# Patient Record
Sex: FEMALE | Race: WHITE | NOT HISPANIC OR LATINO | Employment: OTHER
[De-identification: names, ages, dates, MRNs, and addresses within clinical notes are randomized per-mention and may not be internally consistent; named-entity substitution may affect disease eponyms.]

---

## 2023-04-30 ENCOUNTER — TRANSCRIBE ORDERS (OUTPATIENT)
Dept: OTHER | Age: 68
End: 2023-04-30

## 2023-04-30 DIAGNOSIS — M54.17 LUMBOSACRAL RADICULOPATHY AT L5: Primary | ICD-10-CM

## 2023-05-15 ENCOUNTER — THERAPY VISIT (OUTPATIENT)
Dept: PHYSICAL THERAPY | Facility: CLINIC | Age: 68
End: 2023-05-15
Attending: FAMILY MEDICINE
Payer: COMMERCIAL

## 2023-05-15 DIAGNOSIS — R53.81 PHYSICAL DECONDITIONING: Primary | ICD-10-CM

## 2023-05-15 DIAGNOSIS — M54.17 LUMBOSACRAL RADICULOPATHY AT L5: ICD-10-CM

## 2023-05-15 PROCEDURE — 97161 PT EVAL LOW COMPLEX 20 MIN: CPT | Mod: GP | Performed by: PHYSICAL THERAPIST

## 2023-05-15 PROCEDURE — 97110 THERAPEUTIC EXERCISES: CPT | Mod: GP | Performed by: PHYSICAL THERAPIST

## 2023-05-15 PROCEDURE — 97140 MANUAL THERAPY 1/> REGIONS: CPT | Mod: GP | Performed by: PHYSICAL THERAPIST

## 2023-05-15 NOTE — PROGRESS NOTES
05/15/23 0500   Body Part   Goals listed below are for stnding   Goal #1   Goal #1 standing   Previous Functional Level No restrictions   Current Functional Level Minutes patient can stand   Performance level 5' 9/10   STG Target Performance Minutes patient will be able to stand   Performance level 15'  2/10   Rationale for housekeeping tasks such as vacuuming, bed making, mowing, gardening;for personal hygiene;for meal preparation;for safe household ambulation   Due date 06/01/23   LTG Target Performance Minutes patient will be able to stand   Performance Level 60' no pain   Rationale for housekeeping tasks such as vacuuming, bed making, mowing;for personal hygiene;for meal preparation;for safe household ambulation   Due date 08/01/23                                                                              [facility]    OUTPATIENT Physical Therapy ORTHOPEDIC EVALUATION  PLAN OF TREATMENT FOR OUTPATIENT REHABILITATION  (COMPLETE FOR INITIAL CLAIMS ONLY)  Patient's Last Name, First Name, M.I.  YOB: 1955  Ciara Kaufman    Provider s Name:  [facility]   Medical Record No.  [id number]   Start of Care Date:  05/15/23   Onset Date:   03/15/23   Treatment Diagnosis:  lumbar radic/sciatica left Medical Diagnosis:  Lumbosacral radiculopathy at L5       Goals:     05/15/23 0500   Body Part   Goals listed below are for stnding   Goal #1   Goal #1 standing   Previous Functional Level No restrictions   Current Functional Level Minutes patient can stand   Performance level 5' 9/10   STG Target Performance Minutes patient will be able to stand   Performance level 15'  2/10   Rationale for housekeeping tasks such as vacuuming, bed making, mowing, gardening;for personal hygiene;for meal preparation;for safe household ambulation   Due date 06/01/23   LTG Target Performance Minutes patient will be able to stand   Performance Level 60' no pain    Rationale for housekeeping tasks such as vacuuming, bed making, mowing;for personal hygiene;for meal preparation;for safe household ambulation   Due date 08/01/23       Therapy Frequency:  weekly  Predicted Duration of Therapy Intervention:  12 weeks    Adonis Cronin, PT                 I CERTIFY THE NEED FOR THESE SERVICES FURNISHED UNDER        THIS PLAN OF TREATMENT AND WHILE UNDER MY CARE .             Physician Signature               Date    X_____________________________________________________                         Certification Date From:  05/15/23   Certification Date To:  08/12/23    Referring Provider:  ROMAINE López    Initial Assessment        See Epic Evaluation SOC Date: 05/15/23

## 2023-05-15 NOTE — PROGRESS NOTES
05/15/23 0500   Body Part   Goals listed below are for stnding   Goal #1   Goal #1 standing   Previous Functional Level No restrictions   Current Functional Level Minutes patient can stand   Performance level 5' 9/10   STG Target Performance Minutes patient will be able to stand   Performance level 15'  2/10   Rationale for housekeeping tasks such as vacuuming, bed making, mowing, gardening;for personal hygiene;for meal preparation;for safe household ambulation   Due date 06/01/23   LTG Target Performance Minutes patient will be able to stand   Performance Level 60' no pain   Rationale for housekeeping tasks such as vacuuming, bed making, mowing;for personal hygiene;for meal preparation;for safe household ambulation   Due date 08/01/23                                                                              [facility]    OUTPATIENT Physical Therapy ORTHOPEDIC EVALUATION  PLAN OF TREATMENT FOR OUTPATIENT REHABILITATION  (COMPLETE FOR INITIAL CLAIMS ONLY)  Patient's Last Name, First Name, M.I.  YOB: 1955  Ciara Kaufman    Provider s Name:  [facility]   Medical Record No.  [id number]   Start of Care Date:  05/15/23   Onset Date:   03/15/23   Treatment Diagnosis:  lumbar radic/sciatica left Medical Diagnosis:  Lumbosacral radiculopathy at L5       Goals:     05/15/23 0500   Body Part   Goals listed below are for stnding   Goal #1   Goal #1 standing   Previous Functional Level No restrictions   Current Functional Level Minutes patient can stand   Performance level 5' 9/10   STG Target Performance Minutes patient will be able to stand   Performance level 15'  2/10   Rationale for housekeeping tasks such as vacuuming, bed making, mowing, gardening;for personal hygiene;for meal preparation;for safe household ambulation   Due date 06/01/23   LTG Target Performance Minutes patient will be able to stand   Performance Level 60' no pain    Rationale for housekeeping tasks such as vacuuming, bed making, mowing;for personal hygiene;for meal preparation;for safe household ambulation   Due date 08/01/23       Therapy Frequency:  weekly  Predicted Duration of Therapy Intervention:  12 weeks    Adonis Cronin, PT                 I CERTIFY THE NEED FOR THESE SERVICES FURNISHED UNDER        THIS PLAN OF TREATMENT AND WHILE UNDER MY CARE .             Physician Signature               Date    X_____________________________________________________                         Certification Date From:  05/15/23   Certification Date To:  08/12/23    Referring Provider:  ROMAINE López    Initial Assessment        See Epic Evaluation SOC Date: 05/15/23

## 2023-05-15 NOTE — PROGRESS NOTES
Physical Therapy Initial Evaluation  Subjective:  The history is provided by the patient.   Patient Health History  Ciara Kaufman being seen for Leg pain left to ankle, numbness/tingling, burning over lat calf .     Problem began: 3/25/2023.   Problem occurred: unknown    Pain is reported as 3/10 on pain scale.  General health as reported by patient is good.  Pertinent medical history includes: diabetes.   Red flags:  None as reported by patient.         Current medications:  Other. Other medications details: Diabetes.    Current occupation is Retired.   Primary job tasks include:  Driving.                  Therapist Generated HPI Evaluation  Problem details: Pt reports that in late March she began to experience Left LE pain, burning to her calf, numbness and tingling to her ankle. It was intermittent, leaving when sitting, coming on in stnding.    Pt will benefit from a course of PT to restore her functional level.  .         Type of problem:  Lumbar and sacroiliac.    This is a new condition.  Condition occurred with:  Insidious onset.  Where condition occurred: at home.  Patient reports pain:  Lower lumbar spine, SI joint left and SI joint right.  Pain is described as burning, shooting and aching and is intermittent.  Pain radiates to:  Foot left, lower leg left, gluteals left, knee left and thigh left. Pain is the same all the time.  Since onset symptoms are gradually worsening.  Associated symptoms:  Numbness and tingling. Symptoms are exacerbated by walking and standing  and relieved by rest.      Restrictions due to condition include:  Working in normal job without restrictions.  Barriers include:  None as reported by patient.                        Objective:  System         Lumbar/SI Evaluation  ROM:  AROM Lumbar: normal              Neural Tension/Mobility:      Right side:   SLR w/DF and SLR positive.  Lumbar Palpation:    Tenderness present at Left:    Piriformis  Tenderness not present at Left:     Quadratus Lumborum or Erector Spinae    Tenderness not present at Right:  Quadratus Lumborum or Erector Spinae        SI joint/Sacrum:        Left positive at:    Ilium Ventromedial  Right positive at:    Ilium Ventromedial  Sacral conclusion left:  Posterior inominate, locked, elevated pubic sym and inflare  Sacral conclusion right:  Posterior inominate, locked and upslip                                  Hip Evaluation      Hip Special Testing:    Left hip positive for the following special tests:  Piriformis and An's  Left hip negative for the following special tests:  Yara or SLR   Right hip positive for the following special tests:  SLR and An'sRight hip negative for the following special tests:  Piriformis or Yara    Hip Palpation:    Left hip tenderness present at:   Greater Trachanter; IT Band; Piriformis; ASIS; Abductors; Iliac Crest; Gluteus Medius and Bursa  Right hip tenderness present at:  Greater Trachanter; IT Band; ASIS; Abductors and Iliac Crest             General     ROS    Assessment/Plan:    Patient is a 67 year old female with lumbar, sacral and left side hip complaints.    Patient has the following significant findings with corresponding treatment plan.                Diagnosis 1:  Lumbar radic/sciatica  Pain -  manual therapy, self management and home program  Decreased ROM/flexibility - manual therapy, therapeutic exercise and home program  Decreased joint mobility - manual therapy and therapeutic exercise  Inflammation - cold therapy and US  Impaired muscle performance - neuro re-education and home program  Decreased function - therapeutic activities    Therapy Evaluation Codes:   1) History comprised of:   Personal factors that impact the plan of care:      Coping style, Overall behavior pattern and Past/current experiences.    Comorbidity factors that impact the plan of care are:      Diabetes.     Medications impacting care: Pain.  2) Examination of Body Systems comprised of:   Body  structures and functions that impact the plan of care:      Hip, Lumbar spine and Sacral illiac joint.   Activity limitations that impact the plan of care are:      Standing and Walking.  3) Clinical presentation characteristics are:   Stable/Uncomplicated.  4) Decision-Making    Low complexity using standardized patient assessment instrument and/or measureable assessment of functional outcome.  Cumulative Therapy Evaluation is: Low complexity.    Previous and current functional limitations:  (See Goal Flow Sheet for this information)    Short term and Long term goals: (See Goal Flow Sheet for this information)     Communication ability:  Patient appears to be able to clearly communicate and understand verbal and written communication and follow directions correctly.  Treatment Explanation - The following has been discussed with the patient:   RX ordered/plan of care  Anticipated outcomes  Possible risks and side effects  This patient would benefit from PT intervention to resume normal activities.   Rehab potential is good.    Frequency:  1 X week, once daily  Duration:  for 12 weeks  Discharge Plan:  Achieve all LTG.  Independent in home treatment program.  Reach maximal therapeutic benefit.    Please refer to the daily flowsheet for treatment today, total treatment time and time spent performing 1:1 timed codes.

## 2023-05-21 ENCOUNTER — HEALTH MAINTENANCE LETTER (OUTPATIENT)
Age: 68
End: 2023-05-21

## 2023-05-26 ENCOUNTER — THERAPY VISIT (OUTPATIENT)
Dept: PHYSICAL THERAPY | Facility: CLINIC | Age: 68
End: 2023-05-26
Attending: FAMILY MEDICINE
Payer: COMMERCIAL

## 2023-05-26 DIAGNOSIS — M54.42 CHRONIC LEFT-SIDED LOW BACK PAIN WITH LEFT-SIDED SCIATICA: ICD-10-CM

## 2023-05-26 DIAGNOSIS — G89.29 CHRONIC LEFT-SIDED LOW BACK PAIN WITH LEFT-SIDED SCIATICA: ICD-10-CM

## 2023-05-26 PROCEDURE — 97110 THERAPEUTIC EXERCISES: CPT | Mod: GP | Performed by: PHYSICAL THERAPIST

## 2023-05-31 ENCOUNTER — THERAPY VISIT (OUTPATIENT)
Dept: PHYSICAL THERAPY | Facility: CLINIC | Age: 68
End: 2023-05-31
Attending: FAMILY MEDICINE
Payer: COMMERCIAL

## 2023-05-31 DIAGNOSIS — G89.29 CHRONIC LEFT-SIDED LOW BACK PAIN WITH LEFT-SIDED SCIATICA: Primary | ICD-10-CM

## 2023-05-31 DIAGNOSIS — M54.42 CHRONIC LEFT-SIDED LOW BACK PAIN WITH LEFT-SIDED SCIATICA: Primary | ICD-10-CM

## 2023-05-31 PROCEDURE — 97140 MANUAL THERAPY 1/> REGIONS: CPT | Mod: GP | Performed by: PHYSICAL THERAPIST

## 2023-05-31 PROCEDURE — 97110 THERAPEUTIC EXERCISES: CPT | Mod: GP | Performed by: PHYSICAL THERAPIST

## 2023-06-19 ENCOUNTER — THERAPY VISIT (OUTPATIENT)
Dept: PHYSICAL THERAPY | Facility: CLINIC | Age: 68
End: 2023-06-19
Payer: COMMERCIAL

## 2023-06-19 DIAGNOSIS — G89.29 CHRONIC LEFT-SIDED LOW BACK PAIN WITH LEFT-SIDED SCIATICA: Primary | ICD-10-CM

## 2023-06-19 DIAGNOSIS — M54.42 CHRONIC LEFT-SIDED LOW BACK PAIN WITH LEFT-SIDED SCIATICA: Primary | ICD-10-CM

## 2023-06-19 PROCEDURE — 97140 MANUAL THERAPY 1/> REGIONS: CPT | Mod: GP | Performed by: PHYSICAL THERAPIST

## 2023-06-19 PROCEDURE — 97110 THERAPEUTIC EXERCISES: CPT | Mod: GP | Performed by: PHYSICAL THERAPIST

## 2023-06-26 ENCOUNTER — THERAPY VISIT (OUTPATIENT)
Dept: PHYSICAL THERAPY | Facility: CLINIC | Age: 68
End: 2023-06-26
Payer: COMMERCIAL

## 2023-06-26 DIAGNOSIS — M54.42 CHRONIC LEFT-SIDED LOW BACK PAIN WITH LEFT-SIDED SCIATICA: Primary | ICD-10-CM

## 2023-06-26 DIAGNOSIS — G89.29 CHRONIC LEFT-SIDED LOW BACK PAIN WITH LEFT-SIDED SCIATICA: Primary | ICD-10-CM

## 2023-06-26 PROCEDURE — 97110 THERAPEUTIC EXERCISES: CPT | Mod: GP | Performed by: PHYSICAL THERAPIST

## 2023-07-17 ENCOUNTER — THERAPY VISIT (OUTPATIENT)
Dept: PHYSICAL THERAPY | Facility: CLINIC | Age: 68
End: 2023-07-17
Payer: COMMERCIAL

## 2023-07-17 DIAGNOSIS — M54.42 CHRONIC LEFT-SIDED LOW BACK PAIN WITH LEFT-SIDED SCIATICA: Primary | ICD-10-CM

## 2023-07-17 DIAGNOSIS — G89.29 CHRONIC LEFT-SIDED LOW BACK PAIN WITH LEFT-SIDED SCIATICA: Primary | ICD-10-CM

## 2023-07-17 PROCEDURE — 97110 THERAPEUTIC EXERCISES: CPT | Mod: GP | Performed by: PHYSICAL THERAPIST

## 2024-02-14 PROBLEM — G89.29 CHRONIC LEFT-SIDED LOW BACK PAIN WITH LEFT-SIDED SCIATICA: Status: RESOLVED | Noted: 2023-05-26 | Resolved: 2024-02-14

## 2024-02-14 PROBLEM — M54.42 CHRONIC LEFT-SIDED LOW BACK PAIN WITH LEFT-SIDED SCIATICA: Status: RESOLVED | Noted: 2023-05-26 | Resolved: 2024-02-14

## 2024-02-14 NOTE — PROGRESS NOTES
DISCHARGE  Reason for Discharge: Patient has failed to schedule further appointments.    Equipment Issued:     Discharge Plan: Patient to continue home program.    Referring Provider:  ROMAINE López

## 2024-07-28 ENCOUNTER — HEALTH MAINTENANCE LETTER (OUTPATIENT)
Age: 69
End: 2024-07-28

## 2025-06-08 ENCOUNTER — HEALTH MAINTENANCE LETTER (OUTPATIENT)
Age: 70
End: 2025-06-08

## 2025-08-10 ENCOUNTER — HEALTH MAINTENANCE LETTER (OUTPATIENT)
Age: 70
End: 2025-08-10